# Patient Record
Sex: MALE | Race: WHITE | NOT HISPANIC OR LATINO | Employment: OTHER | URBAN - NONMETROPOLITAN AREA
[De-identification: names, ages, dates, MRNs, and addresses within clinical notes are randomized per-mention and may not be internally consistent; named-entity substitution may affect disease eponyms.]

---

## 2024-08-28 ENCOUNTER — OFFICE VISIT (OUTPATIENT)
Dept: UROLOGY | Facility: CLINIC | Age: 64
End: 2024-08-28
Payer: MEDICARE

## 2024-08-28 ENCOUNTER — PATIENT ROUNDING (BHMG ONLY) (OUTPATIENT)
Dept: UROLOGY | Facility: CLINIC | Age: 64
End: 2024-08-28
Payer: MEDICARE

## 2024-08-28 VITALS
OXYGEN SATURATION: 98 % | HEIGHT: 69 IN | HEART RATE: 93 BPM | WEIGHT: 152.8 LBS | RESPIRATION RATE: 12 BRPM | DIASTOLIC BLOOD PRESSURE: 80 MMHG | SYSTOLIC BLOOD PRESSURE: 120 MMHG | BODY MASS INDEX: 22.63 KG/M2 | TEMPERATURE: 97.7 F

## 2024-08-28 DIAGNOSIS — N41.9 PROSTATITIS, UNSPECIFIED PROSTATITIS TYPE: ICD-10-CM

## 2024-08-28 DIAGNOSIS — R33.8 BENIGN PROSTATIC HYPERPLASIA WITH URINARY RETENTION: ICD-10-CM

## 2024-08-28 DIAGNOSIS — R97.20 ELEVATED PROSTATE SPECIFIC ANTIGEN (PSA): Primary | ICD-10-CM

## 2024-08-28 DIAGNOSIS — N40.1 BENIGN PROSTATIC HYPERPLASIA WITH URINARY RETENTION: ICD-10-CM

## 2024-08-28 PROBLEM — J43.9 EMPHYSEMA, UNSPECIFIED: Status: ACTIVE | Noted: 2020-08-11

## 2024-08-28 PROBLEM — I67.1 CEREBRAL ANEURYSM, NONRUPTURED: Status: ACTIVE | Noted: 2024-08-28

## 2024-08-28 PROBLEM — N52.9 ERECTILE DYSFUNCTION: Status: ACTIVE | Noted: 2019-07-02

## 2024-08-28 PROBLEM — I10 ESSENTIAL HYPERTENSION: Status: ACTIVE | Noted: 2020-08-11

## 2024-08-28 PROBLEM — R53.83 FATIGUE: Status: ACTIVE | Noted: 2022-05-12

## 2024-08-28 PROBLEM — Z90.49 HISTORY OF APPENDECTOMY: Status: ACTIVE | Noted: 2020-08-11

## 2024-08-28 PROBLEM — I42.1 HYPERTROPHIC OBSTRUCTIVE CARDIOMYOPATHY: Status: ACTIVE | Noted: 2019-07-02

## 2024-08-28 PROBLEM — C09.9 TONSILLAR CANCER: Status: ACTIVE | Noted: 2019-12-23

## 2024-08-28 PROBLEM — Z98.890 S/P COIL EMBOLIZATION OF CEREBRAL ANEURYSM: Status: ACTIVE | Noted: 2022-01-04

## 2024-08-28 PROBLEM — J18.9 PNEUMONIA DUE TO INFECTIOUS ORGANISM: Status: RESOLVED | Noted: 2022-05-12 | Resolved: 2024-08-28

## 2024-08-28 PROBLEM — Z95.810 S/P IMPLANTATION OF AUTOMATIC CARDIOVERTER/DEFIBRILLATOR (AICD): Status: ACTIVE | Noted: 2021-05-26

## 2024-08-28 PROBLEM — R29.818 SUSPECTED SLEEP APNEA: Status: ACTIVE | Noted: 2022-05-12

## 2024-08-28 PROBLEM — F17.210 CIGARETTE NICOTINE DEPENDENCE WITHOUT COMPLICATION: Status: ACTIVE | Noted: 2019-07-02

## 2024-08-28 PROBLEM — E78.5 HYPERLIPIDEMIA: Status: ACTIVE | Noted: 2021-12-21

## 2024-08-28 PROBLEM — M17.11 RIGHT KNEE DJD: Status: ACTIVE | Noted: 2021-10-29

## 2024-08-28 LAB
BILIRUB BLD-MCNC: NEGATIVE MG/DL
CLARITY, POC: CLEAR
COLOR UR: YELLOW
EXPIRATION DATE: NORMAL
GLUCOSE UR STRIP-MCNC: NEGATIVE MG/DL
KETONES UR QL: NEGATIVE
LEUKOCYTE EST, POC: NEGATIVE
Lab: NORMAL
NITRITE UR-MCNC: NEGATIVE MG/ML
PH UR: 5.5 [PH] (ref 5–8)
PROT UR STRIP-MCNC: NEGATIVE MG/DL
RBC # UR STRIP: NEGATIVE /UL
SP GR UR: 1.01 (ref 1–1.03)
UROBILINOGEN UR QL: NORMAL

## 2024-08-28 RX ORDER — CIPROFLOXACIN 500 MG/1
500 TABLET, FILM COATED ORAL 2 TIMES DAILY
Qty: 20 TABLET | Refills: 0 | Status: SHIPPED | OUTPATIENT
Start: 2024-08-28 | End: 2024-09-07

## 2024-08-28 RX ORDER — LEVOTHYROXINE SODIUM 150 UG/1
1 TABLET ORAL DAILY
COMMUNITY
Start: 2024-07-02

## 2024-08-28 RX ORDER — ASPIRIN 81 MG/1
81 TABLET, CHEWABLE ORAL
COMMUNITY

## 2024-08-28 RX ORDER — CANDESARTAN 4 MG/1
16 TABLET ORAL DAILY
COMMUNITY

## 2024-08-28 RX ORDER — TADALAFIL 20 MG/1
TABLET ORAL
COMMUNITY
Start: 2024-08-22

## 2024-08-28 NOTE — PROGRESS NOTES
Office Visit Elevated PSA     Patient Name: Danish Bowden  : 1960   MRN: [id number]     Chief Complaint: Elevated PSA.       Referring Provider: Elisabet Tinajero APRN    History of Present Illness: Mr. Danish Bowden is a 64 y.o.  male who presents with an elevated PSA to 10.6 ng/mL       Patient complains of daytime frequency, intermittency, nocturia, sensation of incomplete bladder emptying, urgency, and weak urinary stream.  His IPSS score is 16.     Patient denies fevers, chills, nausea, vomiting, constipation, flank pain, shortness of breath, leg swelling, calf pain or bone pain.    Past  history is negative for BPH, frequent UTIs, gross hematuria, stones or other renal diseases.     Family history of prostate cancer in maternal grandfather.      IPSS Questionnaire (AUA-7):  Incomplete emptying  Over the past month, how often have you had a sensation of not emptying your bladder completely after you finished urinating?: Less than 1 time in 5 (24)  Frequency  Over the past month, how often have you had to urinate again less than two hours after you finishied urinating ?: More than half the time (24)  Intermittency  Over the past month, how often have you found you stopped and started again several time when you urinated ?: Less than 1 time in 5 (24)  Urgency  Over the last month, how often have you found it difficult  have you found it difficult to postpone urination ?: About half the time (24)  Weak Stream  Over the past month, how often have you had a weak urinary stream ?: More than half the time (24)  Straining  Over the past month, how often have you had to push or strain to begin urination ?: Not at all (24)  Nocturia  Over the past month, how many times did you most typically get up to urinate from the time you went to bed until the time you got up in the morning ?: 3 times (24)  Quality of life due to urinary  symptoms  If you were to spend the rest of your life with your urinary condition the way it is now, how would feel about that?: Mostly satisfied (08/28/24 0828)    Scores  Total IPSS Score: (!) 16 (08/28/24 0828)  Total Score = Symptomatic Level: Moderately symptomatic: 8-19 (08/28/24 0828)     Subjective      Review of System: ROS reviewed by me and is negative unless otherwise noted in HPI.      Past Medical History:   Past Medical History:   Diagnosis Date    Cancer 2019    throat    Heart disease     History of cardiac defibrillator placement 2003    Hypertension     Pacemaker 2003    replaced 2013    Pneumonia due to infectious organism 05/12/2022     Past Surgical History:   Past Surgical History:   Procedure Laterality Date    HERNIA REPAIR Right 2020    groin    THROAT SURGERY  2003    ulva and tonsils removed       Family History:   Family History   Problem Relation Age of Onset    Heart disease Father     Heart attack Father     Lung cancer Mother     Prostate cancer Maternal Grandfather     Heart disease Brother     Heart disease Brother     Cardiomyopathy Brother     Heart disease Sister     Heart disease Sister     No Known Problems Daughter     No Known Problems Son     No Known Problems Son        Has no family history of prostate cancer     Social History:   Social History     Socioeconomic History    Marital status:     Number of children: 3   Tobacco Use    Smoking status: Every Day     Current packs/day: 1.00     Average packs/day: 1 pack/day for 52.7 years (52.7 ttl pk-yrs)     Types: Cigarettes     Start date: 1972     Passive exposure: Current    Smokeless tobacco: Never    Tobacco comments:     Trying to quit.  Down to 3 cigarettes per day.     Vaping Use    Vaping status: Never Used   Substance and Sexual Activity    Alcohol use: Yes     Comment: 3 beers per month    Drug use: Yes     Types: Marijuana    Sexual activity: Yes     Partners: Female     Medications:     Current Outpatient  "Medications:     aspirin 81 MG chewable tablet, Chew 1 tablet., Disp: , Rfl:     candesartan (ATACAND) 4 MG tablet, Take 4 tablets by mouth Daily., Disp: , Rfl:     levothyroxine (SYNTHROID, LEVOTHROID) 150 MCG tablet, Take 1 tablet by mouth Daily., Disp: , Rfl:     tadalafil (ADCIRCA) 20 MG tablet tablet, TAKE ONE (1) TABLET BY MOUTH ONE (1) HOUR BEFORE SEXUAL ACTIVITY, Disp: , Rfl:     ciprofloxacin (Cipro) 500 MG tablet, Take 1 tablet by mouth 2 (Two) Times a Day for 10 days., Disp: 20 tablet, Rfl: 0    Allergies:   No Known Allergies    Objective     Physical Exam:   Vital Signs:   Vitals:    08/28/24 0827   BP: 120/80   BP Location: Left arm   Patient Position: Sitting   Cuff Size: Adult   Pulse: 93   Resp: 12   Temp: 97.7 °F (36.5 °C)   TempSrc: Temporal   SpO2: 98%   Weight: 69.3 kg (152 lb 12.8 oz)   Height: 175.1 cm (68.94\")     Body mass index is 22.61 kg/m².   Physical Exam  Vitals and nursing note reviewed. Exam conducted with a chaperone present.   Constitutional:       General: He is not in acute distress.     Appearance: Normal appearance. He is not ill-appearing.   HENT:      Head: Normocephalic and atraumatic.   Pulmonary:      Effort: Pulmonary effort is normal.   Abdominal:      Hernia: There is no hernia in the left inguinal area or right inguinal area.   Genitourinary:     Penis: Circumcised.       Testes: Normal.      Epididymis:      Right: Normal.      Left: Normal.      Prostate: Enlarged, tender (moderate right sided) and nodules present (left small, firm.).      Rectum: External hemorrhoid present. Normal anal tone.   Skin:     General: Skin is warm and dry.   Neurological:      General: No focal deficit present.      Mental Status: He is alert and oriented to person, place, and time.   Psychiatric:         Mood and Affect: Mood normal.         Behavior: Behavior normal.     Labs  Brief Urine Lab Results  (Last result in the past 365 days)        Color   Clarity   Blood   Leuk Est   " Nitrite   Protein   CREAT   Urine HCG        08/28/24 0906 Yellow   Clear   Negative   Negative   Negative   Negative                 Lab Results   Component Value Date    PSA 2.33 07/02/2019     Images:   No Images in the past 120 days found.     ml     Assessment / Plan      Assessment  Mr. Bowden is a 64 y.o.  male who presents with elevated PSA.  This is a new diagnosis of uncertain clinical prognosis.     I explained to the patient that an elevated PSA is a marker of risk of prostate cancer and a ExoDx score would be the next step in evaluation.  The ExoDx score will help determine the probability of finding an aggressive prostate cancer if we were to biopsy.     We discussed that his physical exam was concerning for a small left sided prostate nodule and firmness. Due to tenderness of the prostate I have prescribed Ciprofloxacin for acute prostatitis.  ml he was instructed to double void.  UA today was benign.      He wishes to proceed with ExoDx testing and discuss at next follow up appointment.  Will follow up with him in 2 weeks.     Diagnoses and all orders for this visit:    1. Elevated prostate specific antigen (PSA) (Primary)    2. Prostatitis, unspecified prostatitis type  -     ciprofloxacin (Cipro) 500 MG tablet; Take 1 tablet by mouth 2 (Two) Times a Day for 10 days.  Dispense: 20 tablet; Refill: 0  -     POC Urinalysis Dipstick, Automated    3. Benign prostatic hyperplasia with urinary retention  -     POC Urinalysis Dipstick, Automated    Follow Up:   Return in about 2 weeks (around 9/11/2024) for Next scheduled follow up, with Kina to discuss ExoDx results.      Kina Negrete, APRN, MSN, FNP-C  [facility]

## 2024-08-30 NOTE — PROGRESS NOTES
August 30, 2024    Hello, may I speak with Danish Bowden?    My name is  Elissa.    I am  with [facility]  [address]  [address]  [facility] [address] [phone].    Before we get started may I verify your date of birth? 1960    I am calling to officially welcome you to our practice and ask about your recent visit. Is this a good time to talk?     Tell me about your visit with us. What things went well?         We're always looking for ways to make our patients' experiences even better. Do you have recommendations on ways we may improve?      Overall were you satisfied with your first visit to our practice?        I appreciate you taking the time to speak with me today. Is there anything else I can do for you?       Thank you, and have a great day.

## 2024-09-11 ENCOUNTER — OFFICE VISIT (OUTPATIENT)
Dept: UROLOGY | Facility: CLINIC | Age: 64
End: 2024-09-11
Payer: MEDICARE

## 2024-09-11 VITALS
HEART RATE: 61 BPM | RESPIRATION RATE: 13 BRPM | TEMPERATURE: 97.1 F | BODY MASS INDEX: 22.54 KG/M2 | WEIGHT: 152.2 LBS | HEIGHT: 69 IN

## 2024-09-11 DIAGNOSIS — R97.20 ELEVATED PROSTATE SPECIFIC ANTIGEN (PSA): Primary | ICD-10-CM

## 2024-09-11 DIAGNOSIS — N40.1 BENIGN PROSTATIC HYPERPLASIA WITH URINARY RETENTION: ICD-10-CM

## 2024-09-11 DIAGNOSIS — R33.8 BENIGN PROSTATIC HYPERPLASIA WITH URINARY RETENTION: ICD-10-CM

## 2024-09-11 RX ORDER — VARENICLINE TARTRATE 0.5 (11)-1
KIT ORAL SEE ADMIN INSTRUCTIONS
COMMUNITY
Start: 2024-08-29

## 2024-09-11 NOTE — PROGRESS NOTES
Office Visit Established Male Patient     Patient Name: Danish Bowden  : 1960   MRN: [id number]     Chief Complaint:   Chief Complaint   Patient presents with    Elevated PSA    Follow-up    Results     History of Present Illness: Mr. Danish Bowden is a 64 y.o. male with history of elevated PSA to 10.6 ng/mL. He is accompanied by his wife.  He is here today to review his ExoDX results of 36.1 which indicates an increased risk of finding a high grade prostate cancer if biopsy were to be performed.  His IPSS at his last visit was 16.  No new complaints today.      Subjective      Review of System: ROS reviewed by me and is negative unless otherwise noted in HPI.      Past Medical History:   Past Medical History:   Diagnosis Date    Cancer 2019    throat    Heart disease     History of cardiac defibrillator placement     Hypertension     Pacemaker     replaced     Pneumonia due to infectious organism 2022     Past Surgical History:   Past Surgical History:   Procedure Laterality Date    HERNIA REPAIR Right 2020    groin    THROAT SURGERY      ulva and tonsils removed     Family History:   Family History   Problem Relation Age of Onset    Heart disease Father     Heart attack Father     Lung cancer Mother     Prostate cancer Maternal Grandfather     Heart disease Brother     Heart disease Brother     Cardiomyopathy Brother     Heart disease Sister     Heart disease Sister     No Known Problems Daughter     No Known Problems Son     No Known Problems Son      Social History:   Social History     Socioeconomic History    Marital status:     Number of children: 3   Tobacco Use    Smoking status: Every Day     Current packs/day: 1.00     Average packs/day: 1 pack/day for 52.7 years (52.7 ttl pk-yrs)     Types: Cigarettes     Start date:      Passive exposure: Current    Smokeless tobacco: Never    Tobacco comments:     Trying to quit.  Down to 3 cigarettes per day.     Vaping Use  "   Vaping status: Never Used   Substance and Sexual Activity    Alcohol use: Yes     Comment: 3 beers per month    Drug use: Yes     Types: Marijuana    Sexual activity: Yes     Partners: Female     Medications:     Current Outpatient Medications:     aspirin 81 MG chewable tablet, Chew 1 tablet., Disp: , Rfl:     candesartan (ATACAND) 4 MG tablet, Take 4 tablets by mouth Daily., Disp: , Rfl:     levothyroxine (SYNTHROID, LEVOTHROID) 150 MCG tablet, Take 1 tablet by mouth Daily., Disp: , Rfl:     tadalafil (ADCIRCA) 20 MG tablet tablet, TAKE ONE (1) TABLET BY MOUTH ONE (1) HOUR BEFORE SEXUAL ACTIVITY, Disp: , Rfl:     Varenicline Tartrate, Starter, 0.5 MG X 11 & 1 MG X 42 tablet therapy pack, Take  by mouth See Admin Instructions. Take as directed on package, Disp: , Rfl:     Allergies:   No Known Allergies    Objective     Physical Exam:   Vital Signs:   Vitals:    09/11/24 0859   Pulse: 61   Resp: 13   Temp: 97.1 °F (36.2 °C)   TempSrc: Temporal   Weight: 69 kg (152 lb 3.2 oz)   Height: 175 cm (68.9\")     Body mass index is 22.54 kg/m².   Physical Exam  Vitals and nursing note reviewed.   Constitutional:       General: He is not in acute distress.     Appearance: Normal appearance. He is not ill-appearing.   HENT:      Head: Normocephalic and atraumatic.   Pulmonary:      Effort: Pulmonary effort is normal.   Skin:     General: Skin is warm and dry.   Neurological:      General: No focal deficit present.      Mental Status: He is alert and oriented to person, place, and time.   Psychiatric:         Mood and Affect: Mood normal.         Behavior: Behavior normal.          Labs  Brief Urine Lab Results  (Last result in the past 365 days)        Color   Clarity   Blood   Leuk Est   Nitrite   Protein   CREAT   Urine HCG        08/28/24 0906 Yellow   Clear   Negative   Negative   Negative   Negative                 Lab Results   Component Value Date    CALCIUM 9.1 05/11/2020     05/11/2020    K 4.1 05/11/2020    " "CO2 31.0 (H) 05/11/2020     05/11/2020    BUN 16 05/11/2020    CREATININE 0.90 05/11/2020    EGFRIFAFRI >60 07/02/2019    EGFRIFNONA >60 07/02/2019    BCR 15 07/02/2019    ANIONGAP 6.0 (L) 07/02/2019     No results found for: \"WBC\", \"HGB\", \"HCT\", \"MCV\", \"PLT\"    Radiographic Studies  No Images in the past 120 days found.    I have reviewed the above labs and imaging.     Assessment / Plan      Assessment/Plan: Mr. Danish Bowden is a 64 y.o. male with history of elevated PSA to 10.6 ng/mL.  He is here today to review his ExoDX results of 36.1 which indicates an increased risk of finding a high grade prostate cancer if biopsy were to be performed.  His IPSS at his last visit was 16.      I explained to the patient that an elevated PSA is a marker of risk of prostate cancer and a prostate MRI would be the next step in diagnosis. Mr. Bowden has an ICD.  I have called radiology and they state that there is the possibility of doing the MRI with cardiac clearance.  We discussed that I recommend a prostate biopsy if MRI is not able to be performed. I explained that sampling error can occur with any biopsy and there is a risk of potentially missing a cancer that may be present.    I discussed the risks, benefits, and alternatives to prostate biopsy, including hematuria, hematochezia, and hematospermia.  I also discussed the risk of diagnosing a clinically-insignificant prostate cancer.  I discussed the risks of sepsis, which can be minimized by prophylactic antibiotics.     Diagnoses and all orders for this visit:    1. Elevated prostate specific antigen (PSA) (Primary)  -     LABS SCANNED  -     Culture, Routine - Swab, Per Rectum  -     MRI Prostate With & Without Contrast; Future  -     Ambulatory Referral to Cardiology    2. Benign prostatic hyperplasia with urinary retention       Follow Up:   Return for With Kina to review MRI if it is able to be done.  If not needs scheduled for TRUS prostate biopsy.    Kina N. " TOMMY Negrete, MSN, FNP-C  [facility]

## 2024-09-12 ENCOUNTER — TELEPHONE (OUTPATIENT)
Dept: UROLOGY | Facility: CLINIC | Age: 64
End: 2024-09-12
Payer: MEDICARE

## 2024-09-12 NOTE — TELEPHONE ENCOUNTER
Caller: Danish Bowden     Relationship: [unfilled] SELF    Best call back number: [phone]    What is your medical concern? PT MISSED A CALL FROM [facility] ON 09.11.2024. HE DOES NOT CURRENTLY HAVE A CARDIOLOGIST.   THE ROUND PILLS ARE FINE. PLEASE CALL HIM WITH ANY QUESTIONS OR CONCERNS. THANK YOU

## 2024-09-20 ENCOUNTER — CLINICAL SUPPORT NO REQUIREMENTS (OUTPATIENT)
Dept: CARDIOLOGY | Facility: CLINIC | Age: 64
End: 2024-09-20
Payer: MEDICARE

## 2024-09-20 ENCOUNTER — OFFICE VISIT (OUTPATIENT)
Dept: CARDIOLOGY | Facility: CLINIC | Age: 64
End: 2024-09-20
Payer: MEDICARE

## 2024-09-20 VITALS
BODY MASS INDEX: 22.48 KG/M2 | SYSTOLIC BLOOD PRESSURE: 102 MMHG | WEIGHT: 151.8 LBS | HEIGHT: 69 IN | DIASTOLIC BLOOD PRESSURE: 62 MMHG | OXYGEN SATURATION: 100 % | HEART RATE: 91 BPM

## 2024-09-20 DIAGNOSIS — Z95.810 AUTOMATIC IMPLANTABLE CARDIAC DEFIBRILLATOR IN SITU: Primary | ICD-10-CM

## 2024-09-20 DIAGNOSIS — I73.9 CLAUDICATION OF LEFT LOWER EXTREMITY: ICD-10-CM

## 2024-09-20 DIAGNOSIS — Z95.810 S/P IMPLANTATION OF AUTOMATIC CARDIOVERTER/DEFIBRILLATOR (AICD): ICD-10-CM

## 2024-09-20 DIAGNOSIS — Z72.0 TOBACCO ABUSE: ICD-10-CM

## 2024-09-20 DIAGNOSIS — I10 ESSENTIAL HYPERTENSION: ICD-10-CM

## 2024-09-20 DIAGNOSIS — I42.1 HYPERTROPHIC OBSTRUCTIVE CARDIOMYOPATHY: Primary | ICD-10-CM

## 2024-09-20 PROBLEM — Z87.891 FORMER SMOKER: Status: ACTIVE | Noted: 2024-09-20

## 2024-09-20 PROBLEM — M79.605 LEFT LEG PAIN: Status: ACTIVE | Noted: 2024-09-20

## 2024-09-20 PROCEDURE — 93283 PRGRMG EVAL IMPLANTABLE DFB: CPT | Performed by: INTERNAL MEDICINE

## 2024-09-23 ENCOUNTER — TELEPHONE (OUTPATIENT)
Dept: UROLOGY | Facility: CLINIC | Age: 64
End: 2024-09-23
Payer: MEDICARE

## 2024-09-25 ENCOUNTER — LAB (OUTPATIENT)
Dept: UROLOGY | Facility: CLINIC | Age: 64
End: 2024-09-25
Payer: MEDICARE

## 2024-09-25 ENCOUNTER — DOCUMENTATION (OUTPATIENT)
Dept: UROLOGY | Facility: CLINIC | Age: 64
End: 2024-09-25

## 2024-09-25 ENCOUNTER — OFFICE VISIT (OUTPATIENT)
Dept: UROLOGY | Facility: CLINIC | Age: 64
End: 2024-09-25
Payer: MEDICARE

## 2024-09-25 ENCOUNTER — TELEPHONE (OUTPATIENT)
Dept: UROLOGY | Facility: CLINIC | Age: 64
End: 2024-09-25
Payer: MEDICARE

## 2024-09-25 VITALS
HEART RATE: 64 BPM | DIASTOLIC BLOOD PRESSURE: 80 MMHG | SYSTOLIC BLOOD PRESSURE: 115 MMHG | RESPIRATION RATE: 18 BRPM | OXYGEN SATURATION: 97 % | BODY MASS INDEX: 22.36 KG/M2 | TEMPERATURE: 97.8 F | WEIGHT: 151 LBS

## 2024-09-25 DIAGNOSIS — R97.20 ELEVATED PROSTATE SPECIFIC ANTIGEN (PSA): Primary | ICD-10-CM

## 2024-09-25 PROCEDURE — 1160F RVW MEDS BY RX/DR IN RCRD: CPT | Performed by: NURSE PRACTITIONER

## 2024-09-25 PROCEDURE — 99213 OFFICE O/P EST LOW 20 MIN: CPT | Performed by: NURSE PRACTITIONER

## 2024-09-25 PROCEDURE — 3079F DIAST BP 80-89 MM HG: CPT | Performed by: NURSE PRACTITIONER

## 2024-09-25 PROCEDURE — 3074F SYST BP LT 130 MM HG: CPT | Performed by: NURSE PRACTITIONER

## 2024-09-25 PROCEDURE — 1159F MED LIST DOCD IN RCRD: CPT | Performed by: NURSE PRACTITIONER

## 2024-09-25 RX ORDER — MAGNESIUM HYDROXIDE 1200 MG/15ML
1 LIQUID ORAL ONCE
Qty: 1 ENEMA | Refills: 0 | Status: SHIPPED | OUTPATIENT
Start: 2024-09-25 | End: 2024-09-25

## 2024-09-25 RX ORDER — SULFAMETHOXAZOLE/TRIMETHOPRIM 800-160 MG
1 TABLET ORAL 2 TIMES DAILY
Qty: 6 TABLET | Refills: 0 | Status: SHIPPED | OUTPATIENT
Start: 2024-09-25 | End: 2024-09-28

## 2024-09-25 RX ORDER — CIPROFLOXACIN 500 MG/1
500 TABLET, FILM COATED ORAL 2 TIMES DAILY
Qty: 6 TABLET | Refills: 0 | Status: SHIPPED | OUTPATIENT
Start: 2024-09-25 | End: 2024-09-25

## 2024-10-01 ENCOUNTER — TELEPHONE (OUTPATIENT)
Dept: UROLOGY | Facility: CLINIC | Age: 64
End: 2024-10-01
Payer: MEDICARE

## 2024-10-01 DIAGNOSIS — R97.20 ELEVATED PROSTATE SPECIFIC ANTIGEN (PSA): Primary | ICD-10-CM

## 2024-10-01 LAB
BACTERIA SPEC CULT: ABNORMAL
MICROORGANISM/AGENT SPEC: ABNORMAL
OTHER ANTIBIOTIC SUSC ISLT: ABNORMAL

## 2024-10-01 RX ORDER — FLUCONAZOLE 150 MG/1
150 TABLET ORAL DAILY
Qty: 3 TABLET | Refills: 0 | Status: SHIPPED | OUTPATIENT
Start: 2024-10-01

## 2024-10-01 NOTE — TELEPHONE ENCOUNTER
Attempted to call Mr. Bowden regarding his rectal culture results.  There was no answer.  Left voicemail to return call at his earliest convenience to discuss.

## 2024-10-01 NOTE — TELEPHONE ENCOUNTER
HUB ATTEMPTED TO WARM TRANSFER TO OFFICE.PT RETURNED MISSED VOICEMAIL. OFFICE PLEASE ADVISE.THANK YOU.

## 2024-10-01 NOTE — TELEPHONE ENCOUNTER
Patient notified of positive rectal culture.  I have prescribed Augmentin and Diflucan in addition to the ciprofloxacin that he will start the day before biopsy.  He verbalized understanding of instructions.

## 2024-10-09 ENCOUNTER — HOSPITAL ENCOUNTER (OUTPATIENT)
Dept: ULTRASOUND IMAGING | Facility: HOSPITAL | Age: 64
Discharge: HOME | End: 2024-10-09
Admitting: NURSE PRACTITIONER
Payer: MEDICARE

## 2024-10-09 DIAGNOSIS — I73.9 CLAUDICATION OF LEFT LOWER EXTREMITY: ICD-10-CM

## 2024-10-09 PROCEDURE — 93923 UPR/LXTR ART STDY 3+ LVLS: CPT

## 2024-10-14 ENCOUNTER — DISEASE STATE MANAGEMENT VISIT (OUTPATIENT)
Dept: PHARMACY | Facility: HOSPITAL | Age: 64
End: 2024-10-14
Payer: MEDICARE

## 2024-10-14 VITALS
SYSTOLIC BLOOD PRESSURE: 112 MMHG | OXYGEN SATURATION: 98 % | HEART RATE: 71 BPM | DIASTOLIC BLOOD PRESSURE: 70 MMHG | TEMPERATURE: 98.2 F | WEIGHT: 153.8 LBS | BODY MASS INDEX: 22.78 KG/M2

## 2024-10-14 PROCEDURE — G0463 HOSPITAL OUTPT CLINIC VISIT: HCPCS

## 2024-10-14 PROCEDURE — 99407 BEHAV CHNG SMOKING > 10 MIN: CPT

## 2024-10-14 RX ORDER — BUPROPION HYDROCHLORIDE 150 MG/1
TABLET, EXTENDED RELEASE ORAL
Qty: 57 TABLET | Refills: 0 | Status: SHIPPED | OUTPATIENT
Start: 2024-10-14

## 2024-10-14 NOTE — PROGRESS NOTES
Ambulatory Care Clinic  Smoking Cessation Services       Patient referred to the [facility] by patient to receive smoking cessation services under the [place] Board approved policy. Initial consultation conducted today with PharmD.    Patient has a 52 year pack history with multiple quit attempts. He does report cessation in 2019- late 2022 following cancer requiring oral reconstruction. He reports he had one cigarette one night and then resumed smoking since. He currently craves his first cigarette as soon as he wakes up. About a month ago he started Chantix and got down to 3 cigarettes a day. He reported vivid dreams with it and that he needed to stop. Today he reports that he is not opposed to trying it again in the future, however, would like an alternative treatment.       Past Medical History:   Diagnosis Date    Cancer 2019    throat    Heart disease     History of cardiac defibrillator placement 2003    Hypertension     Pacemaker 2003    replaced 2013    Pneumonia due to infectious organism 05/12/2022     No Known Allergies  Current Outpatient Medications   Medication Sig Dispense Refill    aspirin 81 MG chewable tablet Chew 1 tablet.      candesartan (ATACAND) 4 MG tablet Take 4 tablets by mouth Daily.      levothyroxine (SYNTHROID, LEVOTHROID) 150 MCG tablet Take 1 tablet by mouth Daily.      tadalafil (ADCIRCA) 20 MG tablet tablet TAKE ONE (1) TABLET BY MOUTH ONE (1) HOUR BEFORE SEXUAL ACTIVITY       No current facility-administered medications for this visit.         Assessment:    Tobacco History Evaluation:  Years of tobacco use: ~52  Average number of cigarettes or other tobacco/nicotine products per day: 1ppd  Number of Previous Quit Attempts: 5769-1988 quit after cancer diagnosis.   Previous tobacco cessation medication attempts, failures, intolerances: Chantix (about a month ago)- and did get down to 3 cigarettes. Patient reports that dreams were bad and he had  to stop.  Other recreational substance use: stopped alcohol  1st cigarette of the day smoked: soon as he wakes up    Trigger Evaluation: Cravings with coffee 2-3 cups in the morning. He has one cigarette per cup. He also reports that he craves one after he eats. Cigarette trays are a trigger to him. He also has one prior bed.     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 Patient is not eligible for Gum NRT.    Fagerstrom Test Score:  How soon after you wake do you smoke your first cigarette? Within 5 minutes (3 points)     2. Do you find it difficult to refrain from smoking in places where its forbidden, such as the library, Faith, or theater?   No (0 points)   3. Which cigarette would you hate to give up most? The first one in the morning (1 point)   4. How many cigarettes a day do you smoke? 10 or less (0 points)  11-20 (1 point)  21-30 (2 points)  31 or more (3 points)   5. Do you smoke more frequently during the first hours? Yes (1 point)   6. Do you smoke when you are so ill that you can't get out of bed? No (0 points)   Scorin-2 = Low Dependence: [May not need NRT] Patch: 7mg daily; Lozenge 2mg; Gum 2mg   3-4 = Low to Moderate Dependence: Patch 14mg daily; Inhaler: 6-12 cartridges daily; Lozenge 2mg daily; Gum 2mg daily   5-7 = Moderate Dependence: Patch 21mg daily; Inhaler: 6-12 cartridges daily; Lozenge 4mg; Gum 4mg   8+ = High Dependence: Patch 21mg daily; Inhaler 6-12 cartidges daily; Lozenge 4mg daily; Gum 4mg daily     Readiness to quit:   Stage 1: Not ready to quit in the next month  Stage 2: Ready to quit in the next month  Stage 3: Recent quitter, quit within the past 6 months   Stage 4: Former tobacco user, quit > 6 months ago    Plan:   1. Quit Date Planned: 10/31/2024. Patient will plan to cut 3 cigarettes down daily each week. He plans to write out a rough schedule of when he typically smokes each day  to help visualize which ones are being cut out. We also discussed only putting the cigarettes that he plans to smoke that day in his box and remove the ones that he is cutting down on. Another method we discussed was attempting a hard candy, ie carval lerner's, with his coffee. He reports that holding a cigarette in his hand but not smoking it has helped him in the past. Another method to quit will be to remove esther trays in areas of his home.  2. Behavioral options for quitting reviewed  3. Provided additional resources to help with tobacco cessation. Provided patient with benefits of quitting handout.    4. Medication options reviewed.  Risks, benefits, and side effects discussed and questions answered.   5. Education was provided regarding: motivation to cease tobacco use, drug information on the medication dispensed (including directions for use and adverse effects), nicotine withdrawal symptoms, lifestyle modifications and techniques to prevent relapse.   6. Pursuant to the [place] Board of Pharmacy Approved Protocol,  will order: Bupropion 150mg QAM x 3 days, then 150mg BID 8 hours apart. Avoid bedtime dosing  7. Will notify the patient's PCP, Elisabet Tinajero.  8. Will follow up with patient in 4 weeks      Ramya Irizarry, PharmD  10/14/24  10:15 EDT    Time spent providing smoking cessation counseling: >10 minutes

## 2024-10-14 NOTE — LETTER
October 14, 2024     TOMMY Hardy  [address] [facility] [address]    Patient: Danish Bowden   YOB: 1960   Date of Visit: 10/14/2024       Dear TOMMY Hardy,    Mr. Bowden has referred himself to [facility] for smoking cessation. Below is a copy of my note. Please feel free to reach out with questions or concerns. Thanks         Sincerely,    [facility]    [phone]    PHARMACIST [facility]                                CC: No Recipients     Ambulatory Care Clinic  Smoking Cessation Services       Patient referred to the [facility] by patient to receive smoking cessation services under the [place] Board approved policy. Initial consultation conducted today with PharmD.    Patient has a 52 year pack history with multiple quit attempts. He does report cessation in 2019- late 2022 following cancer requiring oral reconstruction. He reports he had one cigarette one night and then resumed smoking since. He currently craves his first cigarette as soon as he wakes up. About a month ago he started Chantix and got down to 3 cigarettes a day. He reported vivid dreams with it and that he needed to stop. Today he reports that he is not opposed to trying it again in the future, however, would like an alternative treatment.       Past Medical History:   Diagnosis Date   • Cancer 2019    throat   • Heart disease    • History of cardiac defibrillator placement 2003   • Hypertension    • Pacemaker 2003    replaced 2013   • Pneumonia due to infectious organism 05/12/2022     No Known Allergies  Current Outpatient Medications   Medication Sig Dispense Refill   • aspirin 81 MG chewable tablet Chew 1 tablet.     • candesartan (ATACAND) 4 MG tablet Take 4 tablets by mouth Daily.     • levothyroxine (SYNTHROID, LEVOTHROID) 150 MCG tablet Take 1 tablet by mouth Daily.     • tadalafil (ADCIRCA) 20 MG tablet tablet TAKE ONE  (1) TABLET BY MOUTH ONE (1) HOUR BEFORE SEXUAL ACTIVITY       No current facility-administered medications for this visit.         Assessment:    Tobacco History Evaluation:  Years of tobacco use: ~52  Average number of cigarettes or other tobacco/nicotine products per day: 1ppd  Number of Previous Quit Attempts: [id number] quit after cancer diagnosis.   Previous tobacco cessation medication attempts, failures, intolerances: Chantix (about a month ago)- and did get down to 3 cigarettes. Patient reports that dreams were bad and he had to stop.  Other recreational substance use: stopped alcohol  1st cigarette of the day smoked: soon as he wakes up    Trigger Evaluation: Cravings with coffee 2-3 cups in the morning. He has one cigarette per cup. He also reports that he craves one after he eats. Cigarette trays are a trigger to him. He also has one prior bed.     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 Patient is not eligible for Gum NRT.    Fagerstrom Test Score:  How soon after you wake do you smoke your first cigarette? Within 5 minutes (3 points)     2. Do you find it difficult to refrain from smoking in places where its forbidden, such as the library, Mosque, or theater?   No (0 points)   3. Which cigarette would you hate to give up most? The first one in the morning (1 point)   4. How many cigarettes a day do you smoke? 10 or less (0 points)  11-20 (1 point)  21-30 (2 points)  31 or more (3 points)   5. Do you smoke more frequently during the first hours? Yes (1 point)   6. Do you smoke when you are so ill that you can't get out of bed? No (0 points)   Scorin-2 = Low Dependence: [May not need NRT] Patch: 7mg daily; Lozenge 2mg; Gum 2mg   3-4 = Low to Moderate Dependence: Patch 14mg daily; Inhaler: 6-12 cartridges daily; Lozenge 2mg daily; Gum 2mg daily   5-7 = Moderate Dependence: Patch 21mg daily; Inhaler: 6-12  cartridges daily; Lozenge 4mg; Gum 4mg   8+ = High Dependence: Patch 21mg daily; Inhaler 6-12 cartidges daily; Lozenge 4mg daily; Gum 4mg daily     Readiness to quit:   Stage 1: Not ready to quit in the next month  Stage 2: Ready to quit in the next month  Stage 3: Recent quitter, quit within the past 6 months   Stage 4: Former tobacco user, quit > 6 months ago    Plan:   1. Quit Date Planned: 10/31/2024. Patient will plan to cut 3 cigarettes down daily each week. He plans to write out a rough schedule of when he typically smokes each day to help visualize which ones are being cut out. We also discussed only putting the cigarettes that he plans to smoke that day in his box and remove the ones that he is cutting down on. Another method we discussed was attempting a hard candy, ie caramel werhenny's, with his coffee. He reports that holding a cigarette in his hand but not smoking it has helped him in the past. Another method to quit will be to remove esther trays in areas of his home.  2. Behavioral options for quitting reviewed  3. Provided additional resources to help with tobacco cessation. Provided patient with benefits of quitting handout.    4. Medication options reviewed.  Risks, benefits, and side effects discussed and questions answered.   5. Education was provided regarding: motivation to cease tobacco use, drug information on the medication dispensed (including directions for use and adverse effects), nicotine withdrawal symptoms, lifestyle modifications and techniques to prevent relapse.   6. Pursuant to the [place] Board of Pharmacy Approved Protocol,  will order: Bupropion 150mg QAM x 3 days, then 150mg BID 8 hours apart. Avoid bedtime dosing  7. Will notify the patient's PCP, Elisabet Tinajero.  8. Will follow up with patient in 4 weeks      Ramya Irizaryr, PharmD  10/14/24  10:15 EDT    Time spent providing smoking cessation counseling: >10 minutes

## 2024-10-15 ENCOUNTER — TELEPHONE (OUTPATIENT)
Dept: PHARMACY | Facility: HOSPITAL | Age: 64
End: 2024-10-15
Payer: MEDICARE

## 2024-10-15 NOTE — TELEPHONE ENCOUNTER
Patient came to clinic yesterday for smoking cessation. Bupropion was decided upon and Rx was sent to [facility].     Patient called back today and reports that he can get the Rx at a better cost if his PCP writes the Rx. Called and spoke with their office and they plan to get him in today at 4pm. Relayed information to the patient.     Will close his encounter with [facility].

## 2024-10-21 ENCOUNTER — OFFICE VISIT (OUTPATIENT)
Dept: CARDIOLOGY | Facility: CLINIC | Age: 64
End: 2024-10-21
Payer: MEDICARE

## 2024-10-21 VITALS
HEART RATE: 108 BPM | WEIGHT: 156.2 LBS | HEIGHT: 70 IN | OXYGEN SATURATION: 96 % | BODY MASS INDEX: 22.36 KG/M2 | SYSTOLIC BLOOD PRESSURE: 114 MMHG | DIASTOLIC BLOOD PRESSURE: 74 MMHG

## 2024-10-21 DIAGNOSIS — I73.9 PAD (PERIPHERAL ARTERY DISEASE): Primary | ICD-10-CM

## 2024-10-21 DIAGNOSIS — I70.212 ATHEROSCLEROSIS OF NATIVE ARTERIES OF EXTREMITIES WITH INTERMITTENT CLAUDICATION, LEFT LEG: ICD-10-CM

## 2024-10-21 PROCEDURE — 3074F SYST BP LT 130 MM HG: CPT | Performed by: INTERNAL MEDICINE

## 2024-10-21 PROCEDURE — 99214 OFFICE O/P EST MOD 30 MIN: CPT | Performed by: INTERNAL MEDICINE

## 2024-10-21 PROCEDURE — 3078F DIAST BP <80 MM HG: CPT | Performed by: INTERNAL MEDICINE

## 2024-10-21 RX ORDER — ATORVASTATIN CALCIUM 80 MG/1
80 TABLET, FILM COATED ORAL DAILY
Qty: 30 TABLET | Refills: 3 | Status: SHIPPED | OUTPATIENT
Start: 2024-10-21

## 2024-10-21 RX ORDER — CILOSTAZOL 50 MG/1
50 TABLET ORAL 2 TIMES DAILY
Qty: 60 TABLET | Refills: 3 | Status: SHIPPED | OUTPATIENT
Start: 2024-10-21

## 2024-10-21 RX ORDER — GABAPENTIN 300 MG/1
300 CAPSULE ORAL 2 TIMES DAILY
COMMUNITY
Start: 2024-10-16 | End: 2024-12-15

## 2024-10-21 NOTE — PROGRESS NOTES
"             [facility] Cardiology Office Follow Up Note    Danish Bowden  [id number]  10/21/2024    Primary Care Provider: Elisabet Tinajero APRN    Chief Complaint: Left lower extremity pain    History of Present Illness:   Mr. Danish Bowden is a 64 y.o. male who presents to the Cardiology Clinic for follow up of peripheral arterial disease.  The patient has a past medical history significant for hypertension, hyperlipidemia, and prior tonsillar/throat cancer.  He has a past cardiac history significant for HOCM with prior ICD placement.  Upon his last evaluation in the cardiology clinic he reported lower extremity pain particularly on the left which was worse with ambulation.  He continues to have a \"dull\" pain in the left lower extremity which primarily occurs with exertion and is relieved by rest.  He does report some intermittent left lower extremity numbness.  No left lower extremity wounds or ulcerations.  He subsequent had ABIs, which was significantly diminished on the left side.  No other specific complaints today.    Past Cardiac Testin. Last Coronary Angio: None  2. Prior Stress Testing: None  3. Last Echo:    1.  LVEF 65-70%   2.  Moderate concentric hypertrophy   3.  Mild MR and PI  4. Prior Holter Monitor: None    Review of Systems:   Review of Systems   Constitutional:  Negative for activity change, appetite change, chills, diaphoresis, fatigue, fever, unexpected weight gain and unexpected weight loss.   Eyes:  Negative for blurred vision and double vision.   Respiratory:  Negative for cough, chest tightness, shortness of breath and wheezing.    Cardiovascular:  Negative for chest pain, palpitations and leg swelling.   Gastrointestinal:  Negative for abdominal pain, anal bleeding, blood in stool and GERD.   Endocrine: Negative for cold intolerance and heat intolerance.   Genitourinary:  Negative for hematuria.   Musculoskeletal:         Left lower extremity pain with ambulation " "  Neurological:  Positive for numbness. Negative for dizziness, syncope, weakness and light-headedness.   Hematological:  Does not bruise/bleed easily.   Psychiatric/Behavioral:  Negative for depressed mood and stress. The patient is not nervous/anxious.        I have reviewed and/or updated the patient's past medical, past surgical, family, social history, problem list and allergies as appropriate.     Medications:     Current Outpatient Medications:     aspirin 81 MG chewable tablet, Chew 1 tablet., Disp: , Rfl:     buPROPion SR (WELLBUTRIN SR) 150 MG 12 hr tablet, Take 150mg by mouth daily for 3 days, THEN 150mg two times daily for 27 days. Separate two doses out by 8 hours. Avoid bedtime, Disp: 57 tablet, Rfl: 0    candesartan (ATACAND) 4 MG tablet, Take 4 tablets by mouth Daily., Disp: , Rfl:     gabapentin (NEURONTIN) 300 MG capsule, Take 1 capsule by mouth 2 (Two) Times a Day., Disp: , Rfl:     levothyroxine (SYNTHROID, LEVOTHROID) 150 MCG tablet, Take 1 tablet by mouth Daily., Disp: , Rfl:     tadalafil (ADCIRCA) 20 MG tablet tablet, TAKE ONE (1) TABLET BY MOUTH ONE (1) HOUR BEFORE SEXUAL ACTIVITY, Disp: , Rfl:     atorvastatin (LIPITOR) 80 MG tablet, Take 1 tablet by mouth Daily., Disp: 30 tablet, Rfl: 3    cilostazol (PLETAL) 50 MG tablet, Take 1 tablet by mouth 2 (Two) Times a Day., Disp: 60 tablet, Rfl: 3    ciprofloxacin (Cipro) 500 MG tablet, Take 1 tablet by mouth 2 (Two) Times a Day. Start taking the day prior to biopsy, Disp: 6 tablet, Rfl: 0    Physical Exam:  Vital Signs:   Vitals:    10/21/24 1530   BP: 114/74   BP Location: Left arm   Patient Position: Sitting   Cuff Size: Adult   Pulse: 108   SpO2: 96%   Weight: 70.9 kg (156 lb 3.2 oz)   Height: 177.8 cm (70\")       Physical Exam  Constitutional:       General: He is not in acute distress.     Appearance: Normal appearance. He is not diaphoretic.   HENT:      Head: Normocephalic and atraumatic.   Cardiovascular:      Rate and Rhythm: Normal " rate and regular rhythm.      Heart sounds: No murmur heard.     Comments: 2+ right lower extremity pulses.  1+ on the left  Pulmonary:      Effort: Pulmonary effort is normal. No respiratory distress.      Breath sounds: Normal breath sounds. No stridor. No wheezing, rhonchi or rales.   Abdominal:      General: Bowel sounds are normal. There is no distension.      Palpations: Abdomen is soft.      Tenderness: There is no abdominal tenderness. There is no guarding or rebound.   Musculoskeletal:         General: No swelling. Normal range of motion.      Cervical back: Neck supple. No tenderness.   Skin:     General: Skin is warm and dry.   Neurological:      General: No focal deficit present.      Mental Status: He is alert and oriented to person, place, and time.   Psychiatric:         Mood and Affect: Mood normal.         Behavior: Behavior normal.         Results Review:   I reviewed the patient's new clinical results.        Assessment / Plan:     1.  Peripheral arterial disease  -- Recent ABIs found to be severely reduced on the left, normal on the right  --Current symptoms consistent with left lower extremity claudication  --Continue aspirin  --Start high intensity statin  --Start Pletal  --CTA with runoff to further assess left lower extremity PAD    Follow Up:   Return in about 4 weeks (around 11/18/2024).      Thank you for allowing me to participate in the care of your patient. Please to not hesitate to contact me with additional questions or concerns.     WALTER Castro MD  Interventional Cardiology   10/21/2024  15:25 EDT

## 2024-11-04 ENCOUNTER — PROCEDURE VISIT (OUTPATIENT)
Dept: UROLOGY | Facility: CLINIC | Age: 64
End: 2024-11-04
Payer: MEDICARE

## 2024-11-04 DIAGNOSIS — R97.20 ELEVATED PROSTATE SPECIFIC ANTIGEN (PSA): Primary | ICD-10-CM

## 2024-11-04 RX ORDER — MAGNESIUM HYDROXIDE 1200 MG/15ML
1 LIQUID ORAL ONCE
Qty: 1 ENEMA | Refills: 0 | Status: SHIPPED | OUTPATIENT
Start: 2024-11-04 | End: 2024-11-04

## 2024-11-04 RX ORDER — CIPROFLOXACIN 500 MG/1
500 TABLET, FILM COATED ORAL 2 TIMES DAILY
Qty: 6 TABLET | Refills: 0 | Status: SHIPPED | OUTPATIENT
Start: 2024-11-04

## 2024-11-04 NOTE — PROGRESS NOTES
Patient did not stop his Pletal.    We will need to reschedule his prostate biopsy and get clearance from Dr. Castro for him to stop it 5 days beforehand

## 2024-11-13 ENCOUNTER — TELEPHONE (OUTPATIENT)
Dept: CARDIOLOGY | Facility: CLINIC | Age: 64
End: 2024-11-13
Payer: MEDICARE

## 2024-11-13 NOTE — TELEPHONE ENCOUNTER
Per Dr. Castro patient can stop his Cilostazol on Nov 15th for his prostate biopsy. Patient has been notified and verbalized understanding.

## 2024-11-27 ENCOUNTER — HOSPITAL ENCOUNTER (OUTPATIENT)
Dept: CT IMAGING | Facility: HOSPITAL | Age: 64
Discharge: HOME | End: 2024-11-27
Admitting: INTERNAL MEDICINE
Payer: MEDICARE

## 2024-11-27 DIAGNOSIS — I70.212 ATHEROSCLEROSIS OF NATIVE ARTERIES OF EXTREMITIES WITH INTERMITTENT CLAUDICATION, LEFT LEG: ICD-10-CM

## 2024-11-27 DIAGNOSIS — I73.9 PAD (PERIPHERAL ARTERY DISEASE): ICD-10-CM

## 2024-11-27 PROCEDURE — 75635 CT ANGIO ABDOMINAL ARTERIES: CPT

## 2024-11-27 PROCEDURE — 25510000001 IOPAMIDOL 61 % SOLUTION: Performed by: INTERNAL MEDICINE

## 2024-11-27 RX ORDER — IOPAMIDOL 612 MG/ML
100 INJECTION, SOLUTION INTRAVASCULAR
Status: COMPLETED | OUTPATIENT
Start: 2024-11-27 | End: 2024-11-27

## 2024-11-27 RX ADMIN — IOPAMIDOL 100 ML: 612 INJECTION, SOLUTION INTRAVENOUS at 11:14

## 2024-12-06 ENCOUNTER — OFFICE VISIT (OUTPATIENT)
Dept: CARDIOLOGY | Facility: CLINIC | Age: 64
End: 2024-12-06
Payer: MEDICARE

## 2024-12-06 ENCOUNTER — TELEPHONE (OUTPATIENT)
Dept: CARDIOLOGY | Facility: CLINIC | Age: 64
End: 2024-12-06

## 2024-12-06 VITALS
WEIGHT: 159.6 LBS | DIASTOLIC BLOOD PRESSURE: 64 MMHG | HEIGHT: 70 IN | BODY MASS INDEX: 22.85 KG/M2 | SYSTOLIC BLOOD PRESSURE: 100 MMHG

## 2024-12-06 DIAGNOSIS — I71.40 ABDOMINAL AORTIC ANEURYSM (AAA) WITHOUT RUPTURE, UNSPECIFIED PART: ICD-10-CM

## 2024-12-06 DIAGNOSIS — Z95.810 S/P IMPLANTATION OF AUTOMATIC CARDIOVERTER/DEFIBRILLATOR (AICD): ICD-10-CM

## 2024-12-06 DIAGNOSIS — I42.1 HOCM (HYPERTROPHIC OBSTRUCTIVE CARDIOMYOPATHY): ICD-10-CM

## 2024-12-06 DIAGNOSIS — I73.9 PAD (PERIPHERAL ARTERY DISEASE): Primary | ICD-10-CM

## 2024-12-06 DIAGNOSIS — Z72.0 TOBACCO ABUSE: ICD-10-CM

## 2024-12-06 PROCEDURE — 3078F DIAST BP <80 MM HG: CPT | Performed by: INTERNAL MEDICINE

## 2024-12-06 PROCEDURE — 99214 OFFICE O/P EST MOD 30 MIN: CPT | Performed by: INTERNAL MEDICINE

## 2024-12-06 PROCEDURE — 3074F SYST BP LT 130 MM HG: CPT | Performed by: INTERNAL MEDICINE

## 2024-12-06 NOTE — PROGRESS NOTES
[facility] Cardiology Office Follow Up Note    Danish Bowden  [id number]  2024    Primary Care Provider: Elisabet Tinajero APRN    Chief Complaint: Routine follow-up    History of Present Illness:   Mr. Danish Bowden is a 64 y.o. male who presents to the Cardiology Clinic for routine follow-up.  The patient has a past medical history significant for hypertension, peripheral arterial disease, hyperlipidemia, and prior tonsillar/throat cancer.  He has a past cardiac history significant for HOCM with prior ICD placement.  Since his last appointment, the patient had a CTA with runoff demonstrating a 3.6 mm infrarenal AAA with a mural thrombus.  He was also found to have moderate disease throughout the right lower extremity with one-vessel runoff distally.  On the left, he has severe stenosis of the left common iliac artery as well as the left common femoral artery with two-vessel runoff distally.  Today, the patient continues to report left lower extremity pain with ambulation.  No lower extremity wounds or ulcers.  No significant right lower extremity discomfort with ambulation.  From a cardiac standpoint, he has not had any significant exertional dyspnea.  No ICD shocks.  No other specific complaints today.     Past Cardiac Testin. Last Coronary Angio: None  2. Prior Stress Testing: None  3. Last Echo: 2024  1.  Normal left ventricular size and systolic function, LVEF 65-70%.  2.  Mild concentric LVH.  3.  Normal LV diastolic filling pattern.  4.  Normal right ventricular size and systolic function.  5.  Normal left atrial volume index.  6.  Mild calcification of the aortic valve without significant stenosis.  4. Prior Holter Monitor: None      Review of Systems:   Review of Systems   Constitutional:  Negative for activity change, appetite change, chills, diaphoresis, fatigue, fever, unexpected weight gain and unexpected weight loss.   Eyes:  Negative for blurred vision and double  vision.   Respiratory:  Negative for cough, chest tightness, shortness of breath and wheezing.    Cardiovascular:  Negative for chest pain, palpitations and leg swelling.   Gastrointestinal:  Negative for abdominal pain, anal bleeding, blood in stool and GERD.   Endocrine: Negative for cold intolerance and heat intolerance.   Genitourinary:  Negative for hematuria.   Musculoskeletal:         Left lower extremity pain with ambulation   Neurological:  Negative for dizziness, syncope, weakness and light-headedness.   Hematological:  Does not bruise/bleed easily.   Psychiatric/Behavioral:  Negative for depressed mood and stress. The patient is not nervous/anxious.        I have reviewed and/or updated the patient's past medical, past surgical, family, social history, problem list and allergies as appropriate.     Medications:     Current Outpatient Medications:     candesartan (ATACAND) 4 MG tablet, Take 4 tablets by mouth Daily., Disp: , Rfl:     cilostazol (PLETAL) 50 MG tablet, Take 1 tablet by mouth 2 (Two) Times a Day., Disp: 60 tablet, Rfl: 3    gabapentin (NEURONTIN) 300 MG capsule, Take 1 capsule by mouth 2 (Two) Times a Day., Disp: , Rfl:     levothyroxine (SYNTHROID, LEVOTHROID) 150 MCG tablet, Take 1 tablet by mouth Daily., Disp: , Rfl:     tadalafil (ADCIRCA) 20 MG tablet tablet, TAKE ONE (1) TABLET BY MOUTH ONE (1) HOUR BEFORE SEXUAL ACTIVITY, Disp: , Rfl:     aspirin 81 MG chewable tablet, Chew 1 tablet. (Patient not taking: Reported on 12/6/2024), Disp: , Rfl:     atorvastatin (LIPITOR) 80 MG tablet, Take 1 tablet by mouth Daily. (Patient not taking: Reported on 12/6/2024), Disp: 30 tablet, Rfl: 3    buPROPion SR (WELLBUTRIN SR) 150 MG 12 hr tablet, Take 150mg by mouth daily for 3 days, THEN 150mg two times daily for 27 days. Separate two doses out by 8 hours. Avoid bedtime (Patient not taking: Reported on 12/6/2024), Disp: 57 tablet, Rfl: 0    ciprofloxacin (Cipro) 500 MG tablet, Take 1 tablet by mouth 2  "(Two) Times a Day. Start taking the day prior to biopsy (Patient not taking: Reported on 12/6/2024), Disp: 6 tablet, Rfl: 0    Physical Exam:  Vital Signs:   Vitals:    12/06/24 1017   BP: 100/64   BP Location: Right arm   Patient Position: Sitting   Cuff Size: Adult   Weight: 72.4 kg (159 lb 9.6 oz)   Height: 177.8 cm (70\")       Physical Exam  Constitutional:       General: He is not in acute distress.     Appearance: Normal appearance. He is not diaphoretic.   HENT:      Head: Normocephalic and atraumatic.   Cardiovascular:      Rate and Rhythm: Normal rate and regular rhythm.      Heart sounds: No murmur heard.     Comments: Diminished bilateral DP and PT pulses  Pulmonary:      Effort: Pulmonary effort is normal. No respiratory distress.      Breath sounds: Normal breath sounds. No stridor. No wheezing, rhonchi or rales.   Abdominal:      General: Bowel sounds are normal. There is no distension.      Palpations: Abdomen is soft.      Tenderness: There is no abdominal tenderness. There is no guarding or rebound.   Musculoskeletal:         General: No swelling. Normal range of motion.      Cervical back: Neck supple. No tenderness.   Skin:     General: Skin is warm and dry.   Neurological:      General: No focal deficit present.      Mental Status: He is alert and oriented to person, place, and time.   Psychiatric:         Mood and Affect: Mood normal.         Behavior: Behavior normal.         Results Review:   I reviewed the patient's new clinical results.        Assessment / Plan:     1.  Peripheral arterial disease  --Known PAD with significantly reduced ABIs on the left  --CTA with runoff shows a moderate diffuse disease in the right lower extremity with one-vessel runoff.  Severe left common iliac and common femoral stenosis with two-vessel runoff distally  --Will refer to vascular surgery  --Continue Pletal and aspirin  --Inability to tolerate Lipitor due to the size of the pill, will try " Crestor  --Advised a regular exercise walking program  -- Tobacco cessation advised    2.  AAA  -- CTA with runoff demonstrated a 3.6 cm infrarenal AAA with mural thrombus  -- Referral to vascular surgery    3.  HOCM  -- Asymptomatic  --Has ICD in place    4.  Chronic tobacco use  -- Complete cessation advised    Follow Up:   Return in about 3 months (around 3/6/2025).      Thank you for allowing me to participate in the care of your patient. Please to not hesitate to contact me with additional questions or concerns.     WALTER Castro MD  Interventional Cardiology   12/06/2024  10:17 EST

## 2024-12-06 NOTE — TELEPHONE ENCOUNTER
Caller: Danish Bowden    Relationship: Self    Best call back number: [phone]     What is the best time to reach you: ANY     Who are you requesting to speak with (clinical staff, provider,  specific staff member): DAVE DIETRICH     Do you know the name of the person who called: DAVE DIETRICH     What was the call regarding: PT GOT A CALL FROM OUR OFFICE FROM [facility] ABOUT THE MONITOR, PLEASE ADVISE.    NOTHING DOCUMENTED IN CHART ABOUT CALL BACK     Is it okay if the provider responds through MyChart: FINE

## 2025-01-01 ENCOUNTER — APPOINTMENT (OUTPATIENT)
Dept: CT IMAGING | Facility: HOSPITAL | Age: 65
End: 2025-01-01
Payer: MEDICARE

## 2025-01-01 ENCOUNTER — HOSPITAL ENCOUNTER (EMERGENCY)
Facility: HOSPITAL | Age: 65
Discharge: HOME | End: 2025-01-01
Attending: STUDENT IN AN ORGANIZED HEALTH CARE EDUCATION/TRAINING PROGRAM | Admitting: STUDENT IN AN ORGANIZED HEALTH CARE EDUCATION/TRAINING PROGRAM
Payer: MEDICARE

## 2025-01-01 VITALS
TEMPERATURE: 98.1 F | HEART RATE: 82 BPM | BODY MASS INDEX: 21 KG/M2 | SYSTOLIC BLOOD PRESSURE: 164 MMHG | WEIGHT: 150 LBS | HEIGHT: 71 IN | OXYGEN SATURATION: 96 % | DIASTOLIC BLOOD PRESSURE: 107 MMHG | RESPIRATION RATE: 18 BRPM

## 2025-01-01 DIAGNOSIS — R93.7 ABNORMAL CT SCAN, CERVICAL SPINE: ICD-10-CM

## 2025-01-01 DIAGNOSIS — S06.0X0A CONCUSSION WITHOUT LOSS OF CONSCIOUSNESS, INITIAL ENCOUNTER: ICD-10-CM

## 2025-01-01 DIAGNOSIS — M50.30 DEGENERATIVE DISC DISEASE, CERVICAL: ICD-10-CM
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
## 2025-01-01 PROCEDURE — 99284 EMERGENCY DEPT VISIT MOD MDM: CPT | Performed by: STUDENT IN AN ORGANIZED HEALTH CARE EDUCATION/TRAINING PROGRAM

## 2025-01-01 PROCEDURE — 72125 CT NECK SPINE W/O DYE: CPT

## 2025-01-01 PROCEDURE — 70450 CT HEAD/BRAIN W/O DYE: CPT
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
## 2025-01-01 NOTE — DISCHARGE INSTRUCTIONS
Your imaging showed degenerative changes to the cervical spine as well as incidental abnormality of unusual calcific masses between C2 and C4 vertebrae.  Radiologist recommended nonemergent outpatient MRI to rule out tumor calcinosis.  You should follow-up with your PCP for further outpatient evaluation of these findings.  Return to the ER for new or worsening symptoms or acute concerns.

## 2025-02-21 ENCOUNTER — TELEPHONE (OUTPATIENT)
Dept: PHARMACY | Facility: HOSPITAL | Age: 65
End: 2025-02-21
Payer: MEDICARE

## 2025-02-21 NOTE — TELEPHONE ENCOUNTER
Attempted to reach patient regarding follow up with the smoking cessation program. Number on file seems to be inactive.